# Patient Record
Sex: FEMALE | Race: BLACK OR AFRICAN AMERICAN | NOT HISPANIC OR LATINO | URBAN - METROPOLITAN AREA
[De-identification: names, ages, dates, MRNs, and addresses within clinical notes are randomized per-mention and may not be internally consistent; named-entity substitution may affect disease eponyms.]

---

## 2020-10-22 ENCOUNTER — LAB OUTSIDE AN ENCOUNTER (OUTPATIENT)
Dept: URBAN - METROPOLITAN AREA CLINIC 92 | Facility: CLINIC | Age: 50
End: 2020-10-22

## 2020-10-22 ENCOUNTER — OFFICE VISIT (OUTPATIENT)
Dept: URBAN - METROPOLITAN AREA CLINIC 92 | Facility: CLINIC | Age: 50
End: 2020-10-22
Payer: COMMERCIAL

## 2020-10-22 VITALS
DIASTOLIC BLOOD PRESSURE: 99 MMHG | BODY MASS INDEX: 22.53 KG/M2 | HEIGHT: 64 IN | HEART RATE: 72 BPM | SYSTOLIC BLOOD PRESSURE: 150 MMHG | TEMPERATURE: 96.2 F | WEIGHT: 132 LBS

## 2020-10-22 DIAGNOSIS — R10.84 GENERALIZED ABDOMINAL PAIN: ICD-10-CM

## 2020-10-22 DIAGNOSIS — R74.8 ELEVATED LIVER ENZYMES: ICD-10-CM

## 2020-10-22 DIAGNOSIS — Z12.11 COLON CANCER SCREENING: ICD-10-CM

## 2020-10-22 DIAGNOSIS — K58.1 IRRITABLE BOWEL SYNDROME WITH CONSTIPATION: ICD-10-CM

## 2020-10-22 PROBLEM — 440630006: Status: ACTIVE | Noted: 2020-10-22

## 2020-10-22 PROCEDURE — G8482 FLU IMMUNIZE ORDER/ADMIN: HCPCS | Performed by: INTERNAL MEDICINE

## 2020-10-22 PROCEDURE — 99204 OFFICE O/P NEW MOD 45 MIN: CPT | Performed by: INTERNAL MEDICINE

## 2020-10-22 PROCEDURE — G8427 DOCREV CUR MEDS BY ELIG CLIN: HCPCS | Performed by: INTERNAL MEDICINE

## 2020-10-22 PROCEDURE — G8420 CALC BMI NORM PARAMETERS: HCPCS | Performed by: INTERNAL MEDICINE

## 2020-10-22 PROCEDURE — G9903 PT SCRN TBCO ID AS NON USER: HCPCS | Performed by: INTERNAL MEDICINE

## 2020-10-22 RX ORDER — ONDANSETRON HYDROCHLORIDE 4 MG/1
1 TABLET AS NEEDED TABLET, FILM COATED ORAL ONCE A DAY
Qty: 2 | Refills: 0 | OUTPATIENT
Start: 2020-10-22

## 2020-10-22 RX ORDER — POLYETHYLENE GLYOCOL 3350, SODIUM CHLORIDE, SODIUM BICARBONATE AND POTASSIUM CHLORIDE 420; 11.2; 5.72; 1.48 G/4L; G/4L; G/4L; G/4L
AS DIRECTED POWDER, FOR SOLUTION NASOGASTRIC; ORAL AS DIRECTED
Qty: 420 GRAM | Refills: 0 | OUTPATIENT
Start: 2020-10-22 | End: 2020-10-23

## 2020-10-22 RX ORDER — SODIUM, POTASSIUM,MAG SULFATES 17.5-3.13G
354ML SOLUTION, RECONSTITUTED, ORAL ORAL
Qty: 354 MILLILITER | Refills: 0 | OUTPATIENT
Start: 2020-10-22 | End: 2020-10-23

## 2020-10-22 RX ORDER — PANTOPRAZOLE SODIUM 20 MG/1
1 TABLET TABLET, DELAYED RELEASE ORAL ONCE A DAY
Qty: 30 | OUTPATIENT
Start: 2020-10-22

## 2020-10-22 NOTE — HPI-TODAY'S VISIT:
This is a 50-year-old female referred by Dr. Puja Joel for elevated alkaline phosphatase.  Upon review of the chart I saw the patient back in 2012 at the time referred for her constipation bloating and abdominal discomfort.  She was diagnosed with IBS-C.  She had a colonoscopy on November 16, 2012 and this revealed a long redundant tortuous colon with internal hemorrhoids.  There was an area of bulging of the cecal area of unclear etiology so I had her get a CT scan which was done on February 5, 2013 and this revealed no identifiable cause for abdominal pain or other abnormalities except uterine fibroids that were seen.  Patient was told to try over-the-counter options like MiraLAX for her constipation.  She also had an upper GI small bowel follow-through on November 20, 2012 and this was normal.  Patient now presents for a different issue.  Pt had her physical in Sept and had labs and her alk phos was elev and her creatinine was up to. Pt had an abdominal US and it showed a 1.3 cm lesion and so had an MRI and this was normal. Pt says her kidney function normalized. Pt has elevated alk phosphatase still. Pt saw Dr Stratton at [place] last year and they were working up cause of itching and she was going to do work up for celiac-but pt was not gluten free so they could not do an EGD to r/o celiac because. Pt c/o upper gi fullness and pain that is new. Pt tried otc antacids and they have not helped. Pt has 2 autistic kids so has lots of stress.

## 2020-10-25 LAB
ALKALINE PHOSPHATASE: 167
BONE FRACTION:: 64
INTESTINAL FRAC.:: 0
LIVER FRACTION:: 36

## 2020-10-26 ENCOUNTER — TELEPHONE ENCOUNTER (OUTPATIENT)
Dept: URBAN - METROPOLITAN AREA CLINIC 92 | Facility: CLINIC | Age: 50
End: 2020-10-26

## 2020-11-23 ENCOUNTER — LAB OUTSIDE AN ENCOUNTER (OUTPATIENT)
Dept: URBAN - METROPOLITAN AREA CLINIC 96 | Facility: CLINIC | Age: 50
End: 2020-11-23

## 2020-11-24 ENCOUNTER — OFFICE VISIT (OUTPATIENT)
Dept: URBAN - METROPOLITAN AREA SURGERY CENTER 16 | Facility: SURGERY CENTER | Age: 50
End: 2020-11-24

## 2020-11-25 LAB — GGT: 23

## 2020-12-10 ENCOUNTER — OFFICE VISIT (OUTPATIENT)
Dept: URBAN - METROPOLITAN AREA CLINIC 92 | Facility: CLINIC | Age: 50
End: 2020-12-10

## 2020-12-10 RX ORDER — PANTOPRAZOLE SODIUM 20 MG/1
1 TABLET TABLET, DELAYED RELEASE ORAL ONCE A DAY
Qty: 30 | COMMUNITY
Start: 2020-10-22

## 2020-12-10 NOTE — HPI-TODAY'S VISIT:
This is a 50-year-old female referred by Dr. Puja Joel for GI office f/u.  Upon review of the chart I saw the patient back in 2012 at the time referred for her constipation bloating and abdominal discomfort.  She was diagnosed with IBS-C.  She had a colonoscopy on November 16, 2012 and this revealed a long redundant tortuous colon with internal hemorrhoids.  There was an area of bulging of the cecal area of unclear etiology so I had her get a CT scan which was done on February 5, 2013 and this revealed no identifiable cause for abdominal pain or other abnormalities except uterine fibroids that were seen.  Patient was told to try over-the-counter options like MiraLAX for her constipation.  She also had an upper GI small bowel follow-through on November 20, 2012 and this was normal.  Patient presented this fall for a different issue.  Pt had her physical in Sept and had labs and her alk phos was elev and her creatinine was up to. Pt had an abdominal US and it showed a 1.3 cm lesion and so had an MRI and this was normal. Pt says her kidney function normalized. Pt has elevated alk phosphatase still. Pt saw Dr Stratton at [place] last year and they were working up cause of itching and she was going to do work up for celiac-but pt was not gluten free so they could not do an EGD to r/o celiac because. Pt c/o upper gi fullness and pain that is new. Pt tried otc antacids and they have not helped. Pt has 2 autistic kids so has lots of stress.Patient had an MRI ordered and done on October 19, 2020 and this revealed a normal pancreas without any focal lesions atrophy or ductal dilatation she also had a normal-appearing liver on the scan.  Patient has not had her upper endoscopy yet and we have discussed this at the last visit because she had some reflux symptoms and bloating.  She also wanted to consume gluten so I could rule out celiac disease.  Her main issue was that of fullness in the upper GI area.  Over-the-counter antacids had not helped so I recommended she start pantoprazole for this. I ordered a alk phos that was elevat 167 on 10/25/20 and GGT on 11/25/20 was normal at 23.   *A copy of this note will be sent to referring physician.

## 2020-12-17 ENCOUNTER — OFFICE VISIT (OUTPATIENT)
Dept: URBAN - METROPOLITAN AREA CLINIC 92 | Facility: CLINIC | Age: 50
End: 2020-12-17

## 2020-12-28 ENCOUNTER — WEB ENCOUNTER (OUTPATIENT)
Dept: URBAN - METROPOLITAN AREA CLINIC 92 | Facility: CLINIC | Age: 50
End: 2020-12-28

## 2021-01-12 ENCOUNTER — TELEPHONE ENCOUNTER (OUTPATIENT)
Dept: URBAN - METROPOLITAN AREA CLINIC 92 | Facility: CLINIC | Age: 51
End: 2021-01-12

## 2021-01-14 ENCOUNTER — OFFICE VISIT (OUTPATIENT)
Dept: URBAN - METROPOLITAN AREA TELEHEALTH 2 | Facility: TELEHEALTH | Age: 51
End: 2021-01-14
Payer: COMMERCIAL

## 2021-01-14 DIAGNOSIS — R10.13 EPIGASTRIC PAIN: ICD-10-CM

## 2021-01-14 DIAGNOSIS — R74.8 ABNORMAL LIVER ENZYMES: ICD-10-CM

## 2021-01-14 DIAGNOSIS — Z12.11 COLON CANCER SCREENING: ICD-10-CM

## 2021-01-14 PROCEDURE — G8482 FLU IMMUNIZE ORDER/ADMIN: HCPCS | Performed by: INTERNAL MEDICINE

## 2021-01-14 PROCEDURE — 99204 OFFICE O/P NEW MOD 45 MIN: CPT | Performed by: INTERNAL MEDICINE

## 2021-01-14 PROCEDURE — G8427 DOCREV CUR MEDS BY ELIG CLIN: HCPCS | Performed by: INTERNAL MEDICINE

## 2021-01-14 PROCEDURE — G8420 CALC BMI NORM PARAMETERS: HCPCS | Performed by: INTERNAL MEDICINE

## 2021-01-14 PROCEDURE — G9903 PT SCRN TBCO ID AS NON USER: HCPCS | Performed by: INTERNAL MEDICINE

## 2021-01-14 PROCEDURE — 1036F TOBACCO NON-USER: CPT | Performed by: INTERNAL MEDICINE

## 2021-01-14 RX ORDER — POLYETHYLENE GLYCOL-3350, SODIUM CHLORIDE, POTASSIUM CHLORIDE AND SODIUM BICARBONATE 420; 11.2; 5.72; 1.48 G/438.4G; G/438.4G; G/438.4G; G/438.4G
AS DIRECTED POWDER, FOR SOLUTION ORAL AS DIRECTED
Qty: 420 GRAM | Refills: 0 | OUTPATIENT
Start: 2021-01-14 | End: 2021-01-15

## 2021-01-14 RX ORDER — ONDANSETRON HYDROCHLORIDE 4 MG/1
1 TABLET AS NEEDED TABLET, FILM COATED ORAL ONCE A DAY
Qty: 1 TABLET | Refills: 0 | OUTPATIENT
Start: 2021-01-14

## 2021-01-14 RX ORDER — PANTOPRAZOLE SODIUM 20 MG/1
1 TABLET TABLET, DELAYED RELEASE ORAL ONCE A DAY
Qty: 30 | COMMUNITY
Start: 2020-10-22

## 2021-01-29 ENCOUNTER — WEB ENCOUNTER (OUTPATIENT)
Dept: URBAN - METROPOLITAN AREA CLINIC 92 | Facility: CLINIC | Age: 51
End: 2021-01-29

## 2021-02-04 ENCOUNTER — OFFICE VISIT (OUTPATIENT)
Dept: URBAN - METROPOLITAN AREA SURGERY CENTER 16 | Facility: SURGERY CENTER | Age: 51
End: 2021-02-04
Payer: COMMERCIAL

## 2021-02-04 DIAGNOSIS — K52.89 (LYMPHOCYTIC) MICROSCOPIC COLITIS: ICD-10-CM

## 2021-02-04 DIAGNOSIS — K31.89 ACQUIRED DEFORMITY OF DUODENUM: ICD-10-CM

## 2021-02-04 DIAGNOSIS — K29.60 ADENOPAPILLOMATOSIS GASTRICA: ICD-10-CM

## 2021-02-04 DIAGNOSIS — Z12.11 COLON CANCER SCREENING: ICD-10-CM

## 2021-02-04 PROCEDURE — 43239 EGD BIOPSY SINGLE/MULTIPLE: CPT | Performed by: INTERNAL MEDICINE

## 2021-02-04 PROCEDURE — G8907 PT DOC NO EVENTS ON DISCHARG: HCPCS | Performed by: INTERNAL MEDICINE

## 2021-02-04 PROCEDURE — 45380 COLONOSCOPY AND BIOPSY: CPT | Performed by: INTERNAL MEDICINE

## 2021-02-04 RX ORDER — ONDANSETRON HYDROCHLORIDE 4 MG/1
1 TABLET AS NEEDED TABLET, FILM COATED ORAL ONCE A DAY
Qty: 1 TABLET | Refills: 0 | Status: ACTIVE | COMMUNITY
Start: 2021-01-14

## 2021-02-04 RX ORDER — PANTOPRAZOLE SODIUM 20 MG/1
1 TABLET TABLET, DELAYED RELEASE ORAL ONCE A DAY
Qty: 30 | COMMUNITY
Start: 2020-10-22

## 2021-03-12 ENCOUNTER — OFFICE VISIT (OUTPATIENT)
Dept: URBAN - METROPOLITAN AREA TELEHEALTH 2 | Facility: TELEHEALTH | Age: 51
End: 2021-03-12
Payer: COMMERCIAL

## 2021-03-12 VITALS — HEIGHT: 64 IN

## 2021-03-12 DIAGNOSIS — R19.8 BORBORYGMI: ICD-10-CM

## 2021-03-12 DIAGNOSIS — K52.9 ILEITIS: ICD-10-CM

## 2021-03-12 DIAGNOSIS — R74.8 ELEVATED ALKALINE PHOSPHATASE LEVEL: ICD-10-CM

## 2021-03-12 PROCEDURE — 99214 OFFICE O/P EST MOD 30 MIN: CPT | Performed by: INTERNAL MEDICINE

## 2021-03-12 RX ORDER — ONDANSETRON HYDROCHLORIDE 4 MG/1
1 TABLET AS NEEDED TABLET, FILM COATED ORAL ONCE A DAY
Qty: 1 TABLET | Refills: 0 | COMMUNITY
Start: 2021-01-14

## 2021-03-12 RX ORDER — PANTOPRAZOLE SODIUM 20 MG/1
1 TABLET TABLET, DELAYED RELEASE ORAL ONCE A DAY
Qty: 30 | COMMUNITY
Start: 2020-10-22

## 2021-03-12 NOTE — HPI-TODAY'S VISIT:
This is a 50yoF who presents for fu. She was initially seen in 2012 for constipation bloating and abdominal discomfort.  She was diagnosed with IBS-C.   Colonoscopy1 1/2012 revealed a long redundant tortuous colon with internal hemorrhoids.    Notes years of borborygmi without abd pain, N/V, anorexia, weight loss. She has rare indigestion with tomatoes. She is on miralax daily and has BM daily, formed and non-bloody. Eats prunes daily.  Borborygmi not improved with antacids. SHe is gluten free as with all gluten gets a whole body skin rash and stomach irritation. She has seen an allergist who has been giving her steroids when the rash occurs or a small dose before eating gluten.   EGD/Colon 2/4/2021 4mm gastric fundic polyp, IH and single TI ulcer, neg for celiac and HP but +active ileitis--acute, not chronic--?infx vs NSAIDs vs IBD less likely She used to take advil very often, not anymore and has been off this for months.   Pt had her physical in Sept and had labs and her alk phos was elev and her creatinine was up to. Pt had an abdominal US and it showed a 1.3 cm lesion and so had an MRI and this was normal. Labs 10/2020:  and GGT on 11/25/20 was normal at 23.   Unsure of anyone with IBD in family but thyroid disease and MS run in the family

## 2021-04-30 ENCOUNTER — OFFICE VISIT (OUTPATIENT)
Dept: URBAN - METROPOLITAN AREA CLINIC 91 | Facility: CLINIC | Age: 51
End: 2021-04-30

## 2021-06-14 ENCOUNTER — TELEPHONE ENCOUNTER (OUTPATIENT)
Dept: URBAN - METROPOLITAN AREA CLINIC 92 | Facility: CLINIC | Age: 51
End: 2021-06-14

## 2021-06-14 PROBLEM — 56689002: Status: ACTIVE | Noted: 2021-06-14

## 2021-06-30 ENCOUNTER — OFFICE VISIT (OUTPATIENT)
Dept: URBAN - METROPOLITAN AREA CLINIC 91 | Facility: CLINIC | Age: 51
End: 2021-06-30

## 2024-03-18 ENCOUNTER — TELEP (OUTPATIENT)
Dept: URBAN - METROPOLITAN AREA TELEHEALTH 2 | Facility: TELEHEALTH | Age: 54
End: 2024-03-18
Payer: COMMERCIAL

## 2024-03-18 VITALS — WEIGHT: 132 LBS | BODY MASS INDEX: 22.53 KG/M2 | HEIGHT: 64 IN

## 2024-03-18 DIAGNOSIS — K58.2 IRRITABLE BOWEL SYNDROME WITH MIXED BOWEL HABITS: ICD-10-CM

## 2024-03-18 DIAGNOSIS — R19.8 BORBORYGMI: ICD-10-CM

## 2024-03-18 DIAGNOSIS — K52.89 OTHER SPECIFIED NONINFECTIVE GASTROENTERITIS AND COLITIS: ICD-10-CM

## 2024-03-18 PROBLEM — 197125005: Status: ACTIVE | Noted: 2024-03-18

## 2024-03-18 PROCEDURE — 99214 OFFICE O/P EST MOD 30 MIN: CPT | Performed by: PHYSICIAN ASSISTANT

## 2024-03-18 PROCEDURE — 99244 OFF/OP CNSLTJ NEW/EST MOD 40: CPT | Performed by: PHYSICIAN ASSISTANT

## 2024-03-18 RX ORDER — PANTOPRAZOLE SODIUM 20 MG/1
1 TABLET TABLET, DELAYED RELEASE ORAL ONCE A DAY
Qty: 30 | COMMUNITY
Start: 2020-10-22

## 2024-03-18 RX ORDER — ONDANSETRON HYDROCHLORIDE 4 MG/1
1 TABLET AS NEEDED TABLET, FILM COATED ORAL ONCE A DAY
Qty: 1 TABLET | Refills: 0 | COMMUNITY
Start: 2021-01-14

## 2024-03-18 NOTE — HPI-TODAY'S VISIT:
This is a 53yoF who presents for eval of bowel issues, referred by Dr. Talon Betts. A copy of this note will be sent to referring MD.   She was initially seen in 2012 for constipation, bloating and abdominal discomfort.  She was diagnosed with IBS-C.   Colonoscopy1 1/2012 revealed a long redundant tortuous colon with internal hemorrhoids. Then in 2021 had abd pain, N/V, weight loss. EGD/Colon 2/4/2021 4mm gastric fundic polyp, IH and single TI ulcer, neg for celiac and HP but +active ileitis--acute, not chronic--?infx vs NSAIDs vs IBD less likely. Inflammatory markers, IBD panel and pillcam not done.  She now notes some normal days with BMs 2/day, improved on Algen, and on bad days cant go to the bathroom buthas borborygmi and feels the need to go. Then once a month has diarrhea but small volume and cant empty well,multiple bowels. No hematochezia or melena. Abd discomfort 2/month, more with diarrhea episodes. No N/V or GERD. Bloating not as much of an issue.  She is "mostly" gluten free as with all gluten gets a whole body skin rash and stomach irritation. No nsaid use. Biggest is constant borborygmi.   in 2021 alk phos was elev-abdominal US showed a 1.3 cm lesion and so had an MRI and this was normal. Labs 10/2020:  and GGT on 11/25/20 was normal at 23.   Unsure of anyone with IBD in family but thyroid disease and MS run in the family

## 2024-04-29 ENCOUNTER — TELEP (OUTPATIENT)
Dept: URBAN - METROPOLITAN AREA TELEHEALTH 2 | Facility: TELEHEALTH | Age: 54
End: 2024-04-29

## 2024-04-29 VITALS — BODY MASS INDEX: 23.05 KG/M2 | WEIGHT: 135 LBS | HEIGHT: 64 IN

## 2024-04-29 RX ORDER — ONDANSETRON HYDROCHLORIDE 4 MG/1
1 TABLET AS NEEDED TABLET, FILM COATED ORAL ONCE A DAY
Qty: 1 TABLET | Refills: 0 | Status: DISCONTINUED | COMMUNITY
Start: 2021-01-14

## 2024-04-29 RX ORDER — PANTOPRAZOLE SODIUM 20 MG/1
1 TABLET TABLET, DELAYED RELEASE ORAL ONCE A DAY
Qty: 30 | Status: DISCONTINUED | COMMUNITY
Start: 2020-10-22

## 2024-04-29 RX ORDER — METOPROLOL SUCCINATE ER TABLETS 100 MG/1
TAKE ONE TABLET BY MOUTH ONE TIME DAILY TABLET, FILM COATED, EXTENDED RELEASE ORAL
Qty: 30 UNSPECIFIED | Refills: 1 | Status: ACTIVE | COMMUNITY

## 2024-04-29 RX ORDER — OLMESARTAN MEDOXOMIL 40 MG/1
TAKE ONE TABLET BY MOUTH ONE TIME DAILY TABLET ORAL
Qty: 30 UNSPECIFIED | Refills: 1 | Status: ACTIVE | COMMUNITY

## 2024-04-29 RX ORDER — CLOBETASOL PROPIONATE 0.5 MG/G
1 APPLICATION CREAM TOPICAL TWICE A DAY
Status: ACTIVE | COMMUNITY

## 2024-04-29 NOTE — HPI-TODAY'S VISIT:
This is a 53yoF who presents for eval of bowel issues, referred by Dr. Talon Betts. A copy of this note will be sent to referring MD.   She was initially seen in 2012 for constipation, bloating and abdominal discomfort.  She was diagnosed with IBS-C.   Colonoscopy1 1/2012 revealed a long redundant tortuous colon with internal hemorrhoids. Then in 2021 had abd pain, N/V, weight loss. EGD/Colon 2/4/2021 4mm gastric fundic polyp, IH and single TI ulcer, neg for celiac and HP but +active ileitis--acute, not chronic--?infx vs NSAIDs vs IBD less likely. Inflammatory markers, IBD panel and pillcam not done.  She now notes some normal days with BMs 2/day, improved on Algen, and on bad days cant go to the bathroom buthas borborygmi and feels the need to go. Then once a month has diarrhea but small volume and cant empty well,multiple bowels. No hematochezia or melena. Abd discomfort 2/month, more with diarrhea episodes. No N/V or GERD. Bloating not as much of an issue.  She is "mostly" gluten free as with all gluten gets a whole body skin rash and stomach irritation. No nsaid use. Biggest is constant borborygmi. Labs/stool as below and given Xifaxin and notes   in 2021 alk phos was elev-abdominal US showed a 1.3 cm lesion and so had an MRI and this was normal. Labs 10/2020:  and GGT on 11/25/20 was normal at 23.   Unsure of anyone with IBD in family but thyroid disease and MS run in the family  K

## 2025-07-18 ENCOUNTER — DASHBOARD ENCOUNTERS (OUTPATIENT)
Age: 55
End: 2025-07-18

## 2025-07-21 ENCOUNTER — LAB OUTSIDE AN ENCOUNTER (OUTPATIENT)
Dept: URBAN - METROPOLITAN AREA TELEHEALTH 2 | Facility: TELEHEALTH | Age: 55
End: 2025-07-21

## 2025-07-21 ENCOUNTER — OFFICE VISIT (OUTPATIENT)
Dept: URBAN - METROPOLITAN AREA TELEHEALTH 2 | Facility: TELEHEALTH | Age: 55
End: 2025-07-21
Payer: COMMERCIAL

## 2025-07-21 ENCOUNTER — TELEPHONE ENCOUNTER (OUTPATIENT)
Dept: URBAN - METROPOLITAN AREA CLINIC 124 | Facility: CLINIC | Age: 55
End: 2025-07-21

## 2025-07-21 DIAGNOSIS — R19.4 CHANGE IN BOWEL HABITS: ICD-10-CM

## 2025-07-21 DIAGNOSIS — K52.9 ILEITIS: ICD-10-CM

## 2025-07-21 DIAGNOSIS — R19.8 BORBORYGMI: ICD-10-CM

## 2025-07-21 DIAGNOSIS — K58.0 IRRITABLE BOWEL SYNDROME WITH DIARRHEA: ICD-10-CM

## 2025-07-21 DIAGNOSIS — Z12.11 COLON CANCER SCREENING: ICD-10-CM

## 2025-07-21 DIAGNOSIS — K58.1 IRRITABLE BOWEL SYNDROME WITH CONSTIPATION: ICD-10-CM

## 2025-07-21 PROCEDURE — 99214 OFFICE O/P EST MOD 30 MIN: CPT | Performed by: PHYSICIAN ASSISTANT

## 2025-07-21 RX ORDER — FLUCONAZOLE 150 MG/1
1 TABLET TABLET ORAL ONCE
Qty: 1 | Refills: 1 | OUTPATIENT
Start: 2025-07-21 | End: 2025-07-23

## 2025-07-21 RX ORDER — CLOBETASOL PROPIONATE 0.5 MG/G
1 APPLICATION CREAM TOPICAL TWICE A DAY
Status: ACTIVE | COMMUNITY

## 2025-07-21 RX ORDER — METOPROLOL SUCCINATE ER TABLETS 100 MG/1
TAKE ONE TABLET BY MOUTH ONE TIME DAILY TABLET, FILM COATED, EXTENDED RELEASE ORAL
Qty: 30 UNSPECIFIED | Refills: 1 | Status: ACTIVE | COMMUNITY

## 2025-07-21 RX ORDER — OLMESARTAN MEDOXOMIL 40 MG/1
TAKE ONE TABLET BY MOUTH ONE TIME DAILY TABLET ORAL
Qty: 30 UNSPECIFIED | Refills: 1 | Status: ACTIVE | COMMUNITY

## 2025-07-21 NOTE — HPI-TODAY'S VISIT:
This is a 54yoF who presents for evaluation of gas and constipation.   She was initially seen in 2012 for constipation, bloating and abdominal discomfort.  She was diagnosed with IBS-C.   Colonoscopy1 1/2012 revealed a long redundant tortuous colon with internal hemorrhoids. EGD/Colon 2/4/2021 4mm gastric fundic polyp, IH and single TI ulcer, neg for celiac and HP but +active ileitis--acute, not chronic--?infx vs NSAIDs vs IBD less likely. Inflammatory markers, IBD panel and pillcam not done.  In 2024 she noted intermittent diarrhea and constipation with incomplete evacuation, abd discomfort gas and borborgmi.She was rx'd xifaxin and she did well for over a year but now notes recurrent constipation and gas. She was having BMs once/day but now over the last 3 weeks increased gas and bloating and now a week of worsening constipation with assoc lower abd discomfort and borborygmi. No hematochezia or melena. No N/V. Passing gas. No nsaid use now. Admits to high stress-daughter going through a lot. Also notes an odor to her body which she had last time before xifaxin  She tries to be gluten free but not doing well with this recently.   in 2021 alk phos was elev-abdominal US showed a 1.3 cm lesion and so had an MRI and this was normal. Labs 10/2020:  and GGT on 11/25/20 was normal at 23.   Unsure of anyone with IBD in family but thyroid disease and MS run in the family. Normal labs with PCP in the last 4m

## 2025-08-19 ENCOUNTER — OFFICE VISIT (OUTPATIENT)
Dept: URBAN - METROPOLITAN AREA CLINIC 124 | Facility: CLINIC | Age: 55
End: 2025-08-19